# Patient Record
Sex: MALE
[De-identification: names, ages, dates, MRNs, and addresses within clinical notes are randomized per-mention and may not be internally consistent; named-entity substitution may affect disease eponyms.]

---

## 2022-10-17 PROBLEM — Z00.00 ENCOUNTER FOR PREVENTIVE HEALTH EXAMINATION: Status: ACTIVE | Noted: 2022-10-17

## 2022-10-25 ENCOUNTER — APPOINTMENT (OUTPATIENT)
Dept: ORTHOPEDIC SURGERY | Facility: CLINIC | Age: 42
End: 2022-10-25

## 2022-10-25 VITALS — HEIGHT: 69 IN | BODY MASS INDEX: 34.8 KG/M2 | WEIGHT: 235 LBS

## 2022-10-25 DIAGNOSIS — M79.646 PAIN IN UNSPECIFIED FINGER(S): ICD-10-CM

## 2022-10-25 DIAGNOSIS — M18.12 UNILATERAL PRIMARY OSTEOARTHRITIS OF FIRST CARPOMETACARPAL JOINT, LEFT HAND: ICD-10-CM

## 2022-10-25 PROCEDURE — 73140 X-RAY EXAM OF FINGER(S): CPT | Mod: LT

## 2022-10-25 PROCEDURE — 99214 OFFICE O/P EST MOD 30 MIN: CPT | Mod: 25

## 2022-10-25 PROCEDURE — 20600 DRAIN/INJ JOINT/BURSA W/O US: CPT | Mod: FA

## 2022-10-25 PROCEDURE — J3490M: CUSTOM

## 2022-10-26 PROBLEM — M18.12 PRIMARY OSTEOARTHRITIS OF FIRST CARPOMETACARPAL JOINT OF LEFT HAND: Status: ACTIVE | Noted: 2022-10-26

## 2022-10-26 NOTE — PHYSICAL EXAM
[de-identified] : L hand: \par +thumb CMC swelling \par +thumb CMC tenderness \par Decreased thumb ROM \par +Basal grind test\par \par Xrays very mild OA

## 2022-10-26 NOTE — HISTORY OF PRESENT ILLNESS
[Gradual] : gradual [8] : 8 [4] : 4 [Dull/Aching] : dull/aching [Sharp] : sharp [de-identified] : L thumb CMC pain and swelling\par  [] : no [FreeTextEntry1] : left thumb  [FreeTextEntry3] : 3 weeks ago  [FreeTextEntry5] : he is having pain, no injury  [FreeTextEntry9] : tylenol  [FreeTextEntry7] : up the wrist  [de-identified] : activity  [de-identified] : 2 weeks ago  [de-identified] : PCP